# Patient Record
(demographics unavailable — no encounter records)

---

## 2024-10-31 NOTE — PLAN
[TextEntry] : Increase pravastatin 80 mg once daily Increase Entresto 49/51 twice daily Continue same activity routine Follow-up 6 weeks with labs.  Future therapeutic considerations include: Resumption of metoprolol or beginning carvedilol, up titration of Entresto, addition of SGL 2 inhibitor and initiation of MRA.  This approach will likely be a "tough sell" as he remains very medication averse

## 2024-10-31 NOTE — REVIEW OF SYSTEMS
[Nocturia] : nocturia [Negative] : Heme/Lymph [TextEntry] : GEN: Denies appetite change, unusual weakness, bleeding, fever, chills, recent trauma or infection. Weight has been stable.  HEENT: no vision change or epistaxis NECK: No history of thyroid disease LUNGS: No cough or hemoptysis GASTROINTESTINAL: No abdominal pain, nausea, vomiting, hematemesis, diarrhea, constipation, hematochezia, or melena. : No dysuria or frequency. No hematuria. Nocturia x 2-3 SKIN: Denies rash, easy bruising or pruritus. BJE:  no back pain. No cramps or myalgias. NEURO: Denies headache or vertigo. No amaurosis. No new focal motor, sensory or speech symptoms. PSYCH: No anxiety or depression.

## 2024-10-31 NOTE — HISTORY OF PRESENT ILLNESS
[FreeTextEntry1] : Presents in scheduled follow-up reporting that he has been feeling well.  Moderately active including dog walking continues to work part-time at a [employer] without any effort provoke symptoms.  Tolerating Entresto without apparent side effects.  No c/o chest, throat,jaw, arm or upper back discomfort.  No dyspnea, orthopnea or PND.  No palpitations, dizziness or syncope.  No edema or claudication.

## 2024-10-31 NOTE — PHYSICAL EXAM
[Well Developed] : well developed [Well Nourished] : well nourished [No Acute Distress] : no acute distress [Normal Conjunctiva] : normal conjunctiva [Normal Venous Pressure] : normal venous pressure [No Carotid Bruit] : no carotid bruit [No Murmur] : no murmur [No Rub] : no rub [No Gallop] : no gallop [Clear Lung Fields] : clear lung fields [Good Air Entry] : good air entry [No Respiratory Distress] : no respiratory distress  [Soft] : abdomen soft [Non Tender] : non-tender [No Masses/organomegaly] : no masses/organomegaly [Normal Bowel Sounds] : normal bowel sounds [Normal Gait] : normal gait [No Edema] : no edema [No Cyanosis] : no cyanosis [No Clubbing] : no clubbing [No Varicosities] : no varicosities [No Rash] : no rash [No Skin Lesions] : no skin lesions [Moves all extremities] : moves all extremities [No Focal Deficits] : no focal deficits [Normal Speech] : normal speech [Alert and Oriented] : alert and oriented [Normal memory] : normal memory [de-identified] : S1 diminished. [TextEntry] : GEN: pleasant, well-developed. No distress. No icterus or pallor. HEENT: Normal conjunctiva and mucous membranes; no xanthelasma. NECK: No JVD. 2+ equal carotid upstrokes without bruits. No thyromegaly. CHEST: Lungs clear to percussion and auscultation. CARDIAC: PMI not palpable. S1 slightly diminished and S2 normal,  no murmur, gallop, click or rub ABDOMEN: Soft, nontender, no organomegaly. The abdominal aorta is not palpable. No bruits. EXTREMITIES: No cyanosis clubbing or edema. Pedal pulses are 1+ and equal NEURO: Alert and oriented x3. Recent memory appears normal . No focal weakness. No gait or speech disturbance.

## 2024-10-31 NOTE — ASSESSMENT
[FreeTextEntry1] : Ischemic cardiomyopathy.  EF =40%.  He remains very reluctant to change his regimen and especially to make any increases in GDMT.  The imperative of getting him on the ideal regimen strongly emphasized ASHD status post CABG. 100% left main.  Widely patent LIMA to LAD.  100% RCA occlusion.  Saphenous vein graft to distal circumflex with proximal 30% and mid graft 95% lesions.  100% stenosis of the circumflex.  Status post drug-eluting stent to the saphenous vein graft to the mid circumflex. Hypertension Hyperlipidemia-poorly controlled Right bundle branch block PVCs Vertebral artery occlusion

## 2025-02-12 NOTE — REVIEW OF SYSTEMS
[Negative] : Heme/Lymph [Postnasal Drip] : postnasal drip [Sore Throat] : sore throat [Cough] : cough

## 2025-02-13 NOTE — ADDENDUM
[FreeTextEntry1] : I, Jake Damico, acted as a scribe on behalf of Dr. Rodríguez Vazquez MD, on 02/12/2025.   All medical entries made by the scribe were at my, Dr. Rodríguez Vazquez MD, direction and personally dictated by me on 02/12/2025. I have reviewed the chart and agree that the record accurately reflects my personal performance of the history, physical exam, assessment and plan. I have also personally directed, reviewed, and agreed with the chart.

## 2025-02-13 NOTE — HEALTH RISK ASSESSMENT
[No] : In the past 12 months have you used drugs other than those required for medical reasons? No [0] : 2) Feeling down, depressed, or hopeless: Not at all (0) [PHQ-2 Negative - No further assessment needed] : PHQ-2 Negative - No further assessment needed [Never] : Never [[id number]] : 0

## 2025-02-13 NOTE — ASSESSMENT
[FreeTextEntry1] : Acute visit: Acute non-recurrent maxillary sinusitis  -BP is stable. Continue current management. - Start Azithromycin 250MG, Fluticasone Propionate 50 MCG/ACT Nasal Suspension, Promethazine-DM 6.25-15MG/5ML Oral Syrup   - RTO in 3 months   Pt verbalized understanding and will reach should any questions/concerns occur.

## 2025-02-13 NOTE — HISTORY OF PRESENT ILLNESS
[FreeTextEntry8] : Patient is a 74yr old male who is present today for an acute visit.  Patient c/o severe congestion in chest, mucus expulsion (brown and green) and coughing. Pt reports symptoms are worsening. Confirms appetite is stable. Pt denies relief with aspirin. Pt reports his wife experienced similar symptoms one month ago and has since recovered. Denies sore throat, previously intermittent, has not experienced for three days now. Reports congestion affects breathing during the night. Confirms headache last week, denies any more presentation of headaches. Pt reports the sinuses are more bothersome than coughing. Denies ear pain and wheezing. Confirms post-nasal drip. Discussed nasal spray, anti-biotic and cough medicine.   Rapid flu, oral consent received, sample collected.

## 2025-02-13 NOTE — HEALTH RISK ASSESSMENT
[No] : In the past 12 months have you used drugs other than those required for medical reasons? No [0] : 2) Feeling down, depressed, or hopeless: Not at all (0) [PHQ-2 Negative - No further assessment needed] : PHQ-2 Negative - No further assessment needed [Never] : Never [[id number]] : 0

## 2025-02-13 NOTE — PHYSICAL EXAM
[Normal] : no respiratory distress, lungs were clear to auscultation bilaterally and no accessory muscle use [de-identified] : swollen erythematous nasal mucosa

## 2025-02-13 NOTE — ADDENDUM
[FreeTextEntry1] : I, Jkae Damico, acted as a scribe on behalf of Dr. Rodríguez Vazquez MD, on 02/12/2025.   All medical entries made by the scribe were at my, Dr. Rodríguez Vazquez MD, direction and personally dictated by me on 02/12/2025. I have reviewed the chart and agree that the record accurately reflects my personal performance of the history, physical exam, assessment and plan. I have also personally directed, reviewed, and agreed with the chart.

## 2025-02-13 NOTE — PHYSICAL EXAM
[Normal] : no respiratory distress, lungs were clear to auscultation bilaterally and no accessory muscle use [de-identified] : swollen erythematous nasal mucosa

## 2025-04-03 NOTE — PHYSICAL EXAM
[Well Developed] : well developed [Well Nourished] : well nourished [No Acute Distress] : no acute distress [Normal Conjunctiva] : normal conjunctiva [Normal Venous Pressure] : normal venous pressure [No Carotid Bruit] : no carotid bruit [No Murmur] : no murmur [No Rub] : no rub [No Gallop] : no gallop [Clear Lung Fields] : clear lung fields [Good Air Entry] : good air entry [No Respiratory Distress] : no respiratory distress  [Soft] : abdomen soft [Non Tender] : non-tender [No Masses/organomegaly] : no masses/organomegaly [Normal Bowel Sounds] : normal bowel sounds [Normal Gait] : normal gait [No Edema] : no edema [No Cyanosis] : no cyanosis [No Clubbing] : no clubbing [No Varicosities] : no varicosities [No Rash] : no rash [No Skin Lesions] : no skin lesions [Moves all extremities] : moves all extremities [No Focal Deficits] : no focal deficits [Normal Speech] : normal speech [Alert and Oriented] : alert and oriented [Normal memory] : normal memory [de-identified] : S1 diminished. [TextEntry] : GEN: pleasant, well-developed. No distress. No icterus or pallor. HEENT: Normal conjunctiva and mucous membranes; no xanthelasma. NECK: No JVD. 2+ equal carotid upstrokes without bruits. No thyromegaly. CHEST: Lungs clear to percussion and auscultation. CARDIAC: PMI not palpable. S1 slightly diminished and S2 normal,  no murmur, gallop, click or rub ABDOMEN: Soft, nontender, no organomegaly. The abdominal aorta is not palpable. No bruits. EXTREMITIES: No cyanosis clubbing or edema. Pedal pulses are 1+ and equal NEURO: Alert and oriented x3. Recent memory appears normal . No focal weakness. No gait or speech disturbance.

## 2025-04-03 NOTE — ASSESSMENT
[FreeTextEntry1] : Cardiac status appears stable with reasonably good performance status, no apparent dysrhythmia and no stigmata of CHF.  Ischemic cardiomyopathy.  EF =40%.  He remains very reluctant to change his regimen and especially to make any increases in GDMT.  The imperative of getting him on the ideal regimen again strongly emphasized ASHD status post CABG. 100% left main.  Widely patent LIMA to LAD.  100% RCA occlusion.  Saphenous vein graft to distal circumflex with proximal 30% and mid graft 95% lesions.  100% stenosis of the circumflex.  Status post drug-eluting stent to the saphenous vein graft to the mid circumflex. Hypertension Hyperlipidemia-poorly controlled Right bundle branch block PVCs Vertebral artery occlusion

## 2025-04-03 NOTE — PLAN
[TextEntry] : Discontinue lisinopril Resume Entresto 49/51 twice daily Await routine laboratory studies including lipids, CMP and proBNP Continue same activity routine Follow-up 3 months.  Future therapeutic considerations include: Resumption of metoprolol or beginning carvedilol, up titration of Entresto, addition of SGL 2 inhibitor and initiation of MRA.  Compliance is an issue, as he remains very medication averse

## 2025-04-03 NOTE — HISTORY OF PRESENT ILLNESS
[FreeTextEntry1] : Presents in scheduled follow-up reporting that he has been feeling well.  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  There he is on his feet the entire day, walks extensively and does some lifting without any effort provoked symptoms.  Walks his dogs a few times daily.  Notes mild, highly variable dyspnea with stairs without associated chest, throat or jaw discomfort.  No palpitations, dizziness or syncope.  No edema or claudication.  For some reason ("ran out?")  He stopped Entresto and is back on lisinopril 10 mg once daily.

## 2025-06-06 NOTE — HISTORY OF PRESENT ILLNESS
[FreeTextEntry1] : Patient is present today to establish care and for a subsequent Annual Wellness Visit. [de-identified] : Patient is a 75yr old M who is present today to establish care and for a subsequent Annual Wellness Visit.  Patient c/o of dizziness spells with near syncope which resolves after seconds. Pt reports recent fall as a result of dizziness. PSHx of triple bypass 23yrs ago, resulted in ruptured vein that was corrected with stent. Confirms intention to follow up with CARD. Compliant with Plavix. Pt reports major side effects from Pravastatin, only taking 10mg intermittently. Pt also c/o of increased urination frequency and waking up at night as a result. Confirms strong stream during urination and denies hesitation to start. Denies any pain or hematuria. Pt requests surveillance of PSA with bloodwork. Pt reports that he is mostly compliant with diet. Denies previous tobacco use. UTD with Colonoscopy, everything normal. UTD with PNA vaccine. Pt consented to prostate exam.   Denies any CP, chest tightness or SOB. Denies any abdominal pain, urinary symptom, or change in bowel habits. Denies any fever, chills, or night sweats.

## 2025-06-06 NOTE — ASSESSMENT
[Vaccines Reviewed] : Immunizations reviewed today. Please see immunization details in the vaccine log within the immunization flowsheet.  [FreeTextEntry1] : Annual Wellness Visit: BPH w/out urinary obstruction - BP is elevated (systolic 146). Has follow up appointment with cardiology.Continue current management. -UTD with colonoscopy - Order Prostate Ca Screen    - RTO annually or as needed.   Pt verbalized understanding and will reach out should any questions/concerns occur.

## 2025-06-06 NOTE — PHYSICAL EXAM
[Prostate Tenderness] : the prostate was not tender [No Prostate Nodules] : no prostate nodules [Prostate Enlarged] : was enlarged [Normal] : no posterior cervical lymphadenopathy and no anterior cervical lymphadenopathy

## 2025-06-06 NOTE — REASON FOR VISIT
[Annual Wellness Visit] : an annual wellness visit [FreeTextEntry1] : Subsequent Annual Wellness Visit

## 2025-06-06 NOTE — ADDENDUM
[FreeTextEntry1] :  I, America Gee, acted as a scribe on behalf of Dr. Rodríguez Vazquez MD, on 06/06/2025.  All medical entries made by the scribe were at my, Dr. Rodríguez Vazquez MD, direction and personally dictated by me on 06/06/2025. I have reviewed the chart and agree that the record accurately reflects my personal performance of the history, physical exam, assessment and plan. I have also personally directed, reviewed, and agreed with the chart.

## 2025-06-06 NOTE — HEALTH RISK ASSESSMENT
[Good] : ~his/her~  mood as  good [No] : In the past 12 months have you used drugs other than those required for medical reasons? No [Yes] : takes [None] : Patient does not have any barriers to medication adherence [Never] : Never [NO] : No [Reports normal functional visual acuity (ie: able to read med bottle)] : Reports normal functional visual acuity [Smoke Detector] : smoke detector [Carbon Monoxide Detector] : carbon monoxide detector [Safety elements used in home] : safety elements used in home [Seat Belt] :  uses seat belt [Sunscreen] : uses sunscreen [One fall no injury in past year] : Patient reported one fall in the past year without injury [FreeTextEntry1] : health maintenance [de-identified] : Most recent (1yr): CARD (Apr 3 2025), GI (Jul 22 2024), ORTHO (Jun 13 2024) [Change in mental status noted] : No change in mental status noted [Language] : denies difficulty with language [Behavior] : denies difficulty with behavior [Learning/Retaining New Information] : denies difficulty learning/retaining new information [Handling Complex Tasks] : denies difficulty handling complex tasks [Reasoning] : denies difficulty with reasoning [Spatial Ability and Orientation] : denies difficulty with spatial ability and orientation [Reports changes in hearing] : Reports no changes in hearing [Reports changes in vision] : Reports no changes in vision [Reports changes in dental health] : Reports no changes in dental health [Travel to Developing Areas] : does not  travel to developing areas [TB Exposure] : is not being exposed to tuberculosis [Caregiver Concerns] : does not have caregiver concerns [BoneDensityComments] :  n/a [ColonoscopyComments] :  n/a

## 2025-06-26 NOTE — DISCUSSION/SUMMARY
[FreeTextEntry1] : Mr Luna has been experiencing dyspnea with minor activities along with some orthopnea during the past 6 weeks.  His exam shows regular rhythm at a rate of 90, normal blood pressure, clear lungs, no JVD, no murmur, no peripheral edema.  His cardiogram shows sinus rhythm with bifascicular block and is unchanged.  He is clearly become symptomatic during the past month.  His symptoms could be either due to exacerbation of HFrEF, new ischemia, or some combination thereof.  He is not on much of a medical regimen at present.  I told him I wanted him to start taking metoprolol 50 mg/day and Lasix 40 mg/day.  He will return in a week at which time he will have an echocardiogram done and the situation will be reassessed.   [EKG obtained to assist in diagnosis and management of assessed problem(s)] : EKG obtained to assist in diagnosis and management of assessed problem(s)

## 2025-06-26 NOTE — HISTORY OF PRESENT ILLNESS
[FreeTextEntry1] : 75-year-old male with a long history of coronary artery disease, left ventricular dysfunction, HFrEF, poor compliance with medication.  He has been followed in the system for some time.  He underwent bypass surgery a number of years ago and had a stent placed about a year ago.  He reports that following the stent he felt well.  However, since his last visit on 4/23 he has been feeling poorly with increasing shortness of breath along with some pain in his left arm.  He becomes short of breath doing almost any activity and also becomes dyspneic at times during the night.  He states "this is just like I felt before I had my last stent".  His last echo done 1/24 showed moderate to severe segmental left ventricular dysfunction with an ejection fraction of about 40%.  He had a proBNP of 1833 in April.   He is not on standard regimen for HFrEF.  He is taking only Plavix, Pravachol, and apparently lisinopril.  (He was prescribed Entresto but I do not think ever started due to expense.
[FreeTextEntry1] : 75-year-old male with a long history of coronary artery disease, left ventricular dysfunction, HFrEF, poor compliance with medication.  He has been followed in the system for some time.  He underwent bypass surgery a number of years ago and had a stent placed about a year ago.  He reports that following the stent he felt well.  However, since his last visit on 4/23 he has been feeling poorly with increasing shortness of breath along with some pain in his left arm.  He becomes short of breath doing almost any activity and also becomes dyspneic at times during the night.  He states "this is just like I felt before I had my last stent".  His last echo done 1/24 showed moderate to severe segmental left ventricular dysfunction with an ejection fraction of about 40%.  He had a proBNP of 1833 in April.   He is not on standard regimen for HFrEF.  He is taking only Plavix, Pravachol, and apparently lisinopril.  (He was prescribed Entresto but I do not think ever started due to expense.
show